# Patient Record
Sex: MALE
[De-identification: names, ages, dates, MRNs, and addresses within clinical notes are randomized per-mention and may not be internally consistent; named-entity substitution may affect disease eponyms.]

---

## 2023-08-01 ENCOUNTER — APPOINTMENT (OUTPATIENT)
Dept: ORTHOPEDIC SURGERY | Facility: CLINIC | Age: 28
End: 2023-08-01
Payer: COMMERCIAL

## 2023-08-01 VITALS — WEIGHT: 185 LBS | RESPIRATION RATE: 16 BRPM | BODY MASS INDEX: 25.9 KG/M2 | HEIGHT: 71 IN

## 2023-08-01 DIAGNOSIS — Z87.39 PERSONAL HISTORY OF OTHER DISEASES OF THE MUSCULOSKELETAL SYSTEM AND CONNECTIVE TISSUE: ICD-10-CM

## 2023-08-01 DIAGNOSIS — Z78.9 OTHER SPECIFIED HEALTH STATUS: ICD-10-CM

## 2023-08-01 DIAGNOSIS — Z87.19 PERSONAL HISTORY OF OTHER DISEASES OF THE DIGESTIVE SYSTEM: ICD-10-CM

## 2023-08-01 PROBLEM — Z00.00 ENCOUNTER FOR PREVENTIVE HEALTH EXAMINATION: Status: ACTIVE | Noted: 2023-08-01

## 2023-08-01 PROCEDURE — 99203 OFFICE O/P NEW LOW 30 MIN: CPT

## 2023-08-01 PROCEDURE — 73130 X-RAY EXAM OF HAND: CPT | Mod: LT

## 2023-08-01 RX ORDER — BUDESONIDE AND FORMOTEROL FUMARATE DIHYDRATE 160; 4.5 UG/1; UG/1
AEROSOL RESPIRATORY (INHALATION)
Refills: 0 | Status: ACTIVE | COMMUNITY

## 2023-08-01 RX ORDER — DEXTROAMPHETAMINE SACCHARATE, AMPHETAMINE ASPARTATE, DEXTROAMPHETAMINE SULFATE, AND AMPHETAMINE SULFATE 3.75; 3.75; 3.75; 3.75 MG/1; MG/1; MG/1; MG/1
TABLET ORAL
Refills: 0 | Status: ACTIVE | COMMUNITY

## 2023-08-01 RX ORDER — ALBUTEROL 90 MCG
AEROSOL (GRAM) INHALATION
Refills: 0 | Status: ACTIVE | COMMUNITY

## 2023-08-01 RX ORDER — USTEKINUMAB 130 MG/26ML
SOLUTION INTRAVENOUS
Refills: 0 | Status: ACTIVE | COMMUNITY

## 2023-08-11 ENCOUNTER — APPOINTMENT (OUTPATIENT)
Dept: ORTHOPEDIC SURGERY | Facility: CLINIC | Age: 28
End: 2023-08-11
Payer: COMMERCIAL

## 2023-08-11 VITALS — HEIGHT: 71 IN | BODY MASS INDEX: 25.9 KG/M2 | RESPIRATION RATE: 16 BRPM | WEIGHT: 185 LBS

## 2023-08-11 PROCEDURE — 73140 X-RAY EXAM OF FINGER(S): CPT | Mod: F4

## 2023-08-11 PROCEDURE — 99213 OFFICE O/P EST LOW 20 MIN: CPT

## 2023-09-06 ENCOUNTER — APPOINTMENT (OUTPATIENT)
Dept: ORTHOPEDIC SURGERY | Facility: CLINIC | Age: 28
End: 2023-09-06
Payer: COMMERCIAL

## 2023-09-06 VITALS — RESPIRATION RATE: 16 BRPM | HEIGHT: 71 IN | WEIGHT: 185 LBS | BODY MASS INDEX: 25.9 KG/M2

## 2023-09-06 DIAGNOSIS — S69.92XD UNSPECIFIED INJURY OF LEFT WRIST, HAND AND FINGER(S), SUBSEQUENT ENCOUNTER: ICD-10-CM

## 2023-09-06 PROCEDURE — 99213 OFFICE O/P EST LOW 20 MIN: CPT

## 2023-09-06 PROCEDURE — 73140 X-RAY EXAM OF FINGER(S): CPT | Mod: F4

## 2023-09-06 NOTE — PHYSICAL EXAM
[de-identified] : Skin is intact no evidence of infection no evidence of joint instability of the volar plate or collateral ligaments with the flexion extensor mechanism intact.  Residual swelling of the PIP joint but noted swelling or tenderness of the MP or DIP joint  Range of motion 0/90 of the MP 5/100 of the PIP and 0/90 of the DIP joint.  The left fifth PIP joint is missing 5 degrees terminal extension and 10 degrees terminal flexion as compared to the opposite side.  There is soft endpoints and no evidence of instability   [de-identified] : PA lateral and oblique shows a congruent joint with mild resorption of the bone fragment from the left fifth middle phalanx.

## 2023-09-06 NOTE — ASSESSMENT
[FreeTextEntry1] : 6 weeks status post left fifth PIP dislocation with associated fracture.  Patient has a stable joint but residual swelling and stiffness which has not resolved despite home program.  Options were discussed ranging from conservative management to more aggressive forms of treatment such as therapy, injection, or surgery.  Risk associated with each option discussed and all questions answered.  Since the range of motion has not been fully restored with his independent home program he would like to follow-up with an outside therapist.  A prescription was provided.  If full range of motion is not restored in the next 6 weeks he will return to the office for reevaluation and more aggressive forms of treatment such as injections may be considered.

## 2023-09-06 NOTE — HISTORY OF PRESENT ILLNESS
[Right] : right hand dominant [FreeTextEntry1] : Date of injury: July 26, 2023  Patient here 6 weeks status post left fifth middle phalanx volar plate avulsion fracture with dislocation.  Patient complains of mostly stiffness in the finger.  Patient has not gone to therapy but does have a home program.

## 2023-10-18 ENCOUNTER — APPOINTMENT (OUTPATIENT)
Dept: ORTHOPEDIC SURGERY | Facility: CLINIC | Age: 28
End: 2023-10-18

## 2024-12-25 PROBLEM — F10.90 ALCOHOL USE: Status: ACTIVE | Noted: 2023-08-01

## 2025-06-18 ENCOUNTER — APPOINTMENT (OUTPATIENT)
Dept: GASTROENTEROLOGY | Facility: CLINIC | Age: 30
End: 2025-06-18

## 2025-07-08 ENCOUNTER — APPOINTMENT (OUTPATIENT)
Dept: GASTROENTEROLOGY | Facility: CLINIC | Age: 30
End: 2025-07-08
Payer: COMMERCIAL

## 2025-07-08 VITALS
DIASTOLIC BLOOD PRESSURE: 80 MMHG | OXYGEN SATURATION: 98 % | TEMPERATURE: 95.6 F | BODY MASS INDEX: 26.6 KG/M2 | RESPIRATION RATE: 14 BRPM | HEIGHT: 71 IN | SYSTOLIC BLOOD PRESSURE: 126 MMHG | HEART RATE: 68 BPM | WEIGHT: 190 LBS

## 2025-07-08 PROBLEM — K50.10 CROHN'S DISEASE OF LARGE INTESTINE WITHOUT COMPLICATION: Status: ACTIVE | Noted: 2025-07-08

## 2025-07-08 PROCEDURE — G2211 COMPLEX E/M VISIT ADD ON: CPT | Mod: NC

## 2025-07-08 PROCEDURE — 99205 OFFICE O/P NEW HI 60 MIN: CPT

## 2025-07-08 RX ORDER — ONDANSETRON 4 MG/1
4 TABLET, ORALLY DISINTEGRATING ORAL
Qty: 30 | Refills: 3 | Status: ACTIVE | COMMUNITY
Start: 2025-07-08 | End: 1900-01-01

## 2025-07-08 RX ORDER — OMEPRAZOLE 20 MG/1
20 CAPSULE, DELAYED RELEASE ORAL DAILY
Qty: 30 | Refills: 3 | Status: ACTIVE | COMMUNITY
Start: 2025-07-08 | End: 1900-01-01

## 2025-07-08 RX ORDER — USTEKINUMAB 90 MG/ML
90 INJECTION, SOLUTION SUBCUTANEOUS
Qty: 4 | Refills: 11 | Status: ACTIVE | COMMUNITY
Start: 2025-07-08 | End: 1900-01-01

## 2025-07-21 ENCOUNTER — TRANSCRIPTION ENCOUNTER (OUTPATIENT)
Age: 30
End: 2025-07-21

## 2025-07-22 ENCOUNTER — TRANSCRIPTION ENCOUNTER (OUTPATIENT)
Age: 30
End: 2025-07-22

## 2025-07-22 ENCOUNTER — RESULT REVIEW (OUTPATIENT)
Age: 30
End: 2025-07-22

## 2025-07-22 ENCOUNTER — APPOINTMENT (OUTPATIENT)
Dept: MRI IMAGING | Facility: HOSPITAL | Age: 30
End: 2025-07-22

## 2025-07-22 ENCOUNTER — OUTPATIENT (OUTPATIENT)
Dept: OUTPATIENT SERVICES | Facility: HOSPITAL | Age: 30
LOS: 1 days | End: 2025-07-22
Payer: COMMERCIAL

## 2025-07-22 PROCEDURE — 74183 MRI ABD W/O CNTR FLWD CNTR: CPT | Mod: 26

## 2025-07-22 PROCEDURE — 72197 MRI PELVIS W/O & W/DYE: CPT | Mod: 26

## 2025-07-29 ENCOUNTER — APPOINTMENT (OUTPATIENT)
Dept: PEDIATRIC ALLERGY IMMUNOLOGY | Facility: CLINIC | Age: 30
End: 2025-07-29

## 2025-08-04 ENCOUNTER — TRANSCRIPTION ENCOUNTER (OUTPATIENT)
Age: 30
End: 2025-08-04

## 2025-08-13 ENCOUNTER — TRANSCRIPTION ENCOUNTER (OUTPATIENT)
Age: 30
End: 2025-08-13

## 2025-08-14 ENCOUNTER — TRANSCRIPTION ENCOUNTER (OUTPATIENT)
Age: 30
End: 2025-08-14

## 2025-08-19 ENCOUNTER — TRANSCRIPTION ENCOUNTER (OUTPATIENT)
Age: 30
End: 2025-08-19

## 2025-08-21 ENCOUNTER — TRANSCRIPTION ENCOUNTER (OUTPATIENT)
Age: 30
End: 2025-08-21

## 2025-08-22 RX ORDER — USTEKINUMAB 90 MG/ML
90 INJECTION, SOLUTION SUBCUTANEOUS
Qty: 1 | Refills: 5 | Status: ACTIVE | COMMUNITY
Start: 2025-08-18 | End: 1900-01-01

## 2025-09-03 ENCOUNTER — APPOINTMENT (OUTPATIENT)
Dept: INTERNAL MEDICINE | Facility: CLINIC | Age: 30
End: 2025-09-03
Payer: COMMERCIAL

## 2025-09-03 ENCOUNTER — NON-APPOINTMENT (OUTPATIENT)
Age: 30
End: 2025-09-03

## 2025-09-03 VITALS
DIASTOLIC BLOOD PRESSURE: 88 MMHG | TEMPERATURE: 98 F | WEIGHT: 190 LBS | HEIGHT: 71 IN | SYSTOLIC BLOOD PRESSURE: 142 MMHG | HEART RATE: 60 BPM | BODY MASS INDEX: 26.6 KG/M2 | OXYGEN SATURATION: 95 %

## 2025-09-03 DIAGNOSIS — B00.9 HERPESVIRAL INFECTION, UNSPECIFIED: ICD-10-CM

## 2025-09-03 DIAGNOSIS — L65.9 NONSCARRING HAIR LOSS, UNSPECIFIED: ICD-10-CM

## 2025-09-03 DIAGNOSIS — Z00.00 ENCOUNTER FOR GENERAL ADULT MEDICAL EXAMINATION W/OUT ABNORMAL FINDINGS: ICD-10-CM

## 2025-09-03 DIAGNOSIS — J45.909 UNSPECIFIED ASTHMA, UNCOMPLICATED: ICD-10-CM

## 2025-09-03 DIAGNOSIS — I10 ESSENTIAL (PRIMARY) HYPERTENSION: ICD-10-CM

## 2025-09-03 PROCEDURE — 99385 PREV VISIT NEW AGE 18-39: CPT

## 2025-09-03 PROCEDURE — 36415 COLL VENOUS BLD VENIPUNCTURE: CPT

## 2025-09-03 RX ORDER — FINASTERIDE 1 MG/1
1 TABLET ORAL DAILY
Qty: 90 | Refills: 1 | Status: ACTIVE | COMMUNITY
Start: 1900-01-01 | End: 1900-01-01

## 2025-09-03 RX ORDER — FINASTERIDE 5 MG/1
5 TABLET, FILM COATED ORAL
Refills: 0 | Status: DISCONTINUED | COMMUNITY
End: 2025-09-03

## 2025-09-03 RX ORDER — VALACYCLOVIR 500 MG/1
500 TABLET, FILM COATED ORAL DAILY
Qty: 90 | Refills: 1 | Status: ACTIVE | COMMUNITY
Start: 1900-01-01 | End: 1900-01-01

## 2025-09-03 RX ORDER — ALBUTEROL SULFATE 90 UG/1
108 (90 BASE) INHALANT RESPIRATORY (INHALATION)
Qty: 1 | Refills: 2 | Status: ACTIVE | COMMUNITY
Start: 2025-09-03 | End: 1900-01-01

## 2025-09-03 RX ORDER — TELMISARTAN 20 MG/1
20 TABLET ORAL DAILY
Qty: 90 | Refills: 1 | Status: ACTIVE | COMMUNITY
Start: 1900-01-01 | End: 1900-01-01

## 2025-09-03 RX ORDER — BUDESONIDE AND FORMOTEROL FUMARATE DIHYDRATE 160; 4.5 UG/1; UG/1
160-4.5 AEROSOL RESPIRATORY (INHALATION)
Qty: 1 | Refills: 2 | Status: ACTIVE | COMMUNITY
Start: 2025-09-03 | End: 1900-01-01

## 2025-09-04 ENCOUNTER — TRANSCRIPTION ENCOUNTER (OUTPATIENT)
Age: 30
End: 2025-09-04

## 2025-09-04 LAB
25(OH)D3 SERPL-MCNC: 41.3 NG/ML
ALBUMIN SERPL ELPH-MCNC: 4.9 G/DL
ALP BLD-CCNC: 38 U/L
ALT SERPL-CCNC: 24 U/L
ANION GAP SERPL CALC-SCNC: 15 MMOL/L
APPEARANCE: CLEAR
AST SERPL-CCNC: 26 U/L
BASOPHILS # BLD AUTO: 0.05 K/UL
BASOPHILS NFR BLD AUTO: 0.7 %
BILIRUB SERPL-MCNC: 0.6 MG/DL
BILIRUBIN URINE: NEGATIVE
BLOOD URINE: NEGATIVE
BUN SERPL-MCNC: 14 MG/DL
CALCIUM SERPL-MCNC: 9.7 MG/DL
CHLORIDE SERPL-SCNC: 102 MMOL/L
CHOLEST SERPL-MCNC: 202 MG/DL
CO2 SERPL-SCNC: 24 MMOL/L
COLOR: YELLOW
CREAT SERPL-MCNC: 1.06 MG/DL
EGFRCR SERPLBLD CKD-EPI 2021: 99 ML/MIN/1.73M2
EOSINOPHIL # BLD AUTO: 0.24 K/UL
EOSINOPHIL NFR BLD AUTO: 3.6 %
ESTIMATED AVERAGE GLUCOSE: 88 MG/DL
GLUCOSE QUALITATIVE U: NEGATIVE MG/DL
GLUCOSE SERPL-MCNC: 88 MG/DL
HBA1C MFR BLD HPLC: 4.7 %
HCT VFR BLD CALC: 42.8 %
HDLC SERPL-MCNC: 50 MG/DL
HGB BLD-MCNC: 14.4 G/DL
IMM GRANULOCYTES NFR BLD AUTO: 0.3 %
KETONES URINE: NEGATIVE MG/DL
LDLC SERPL-MCNC: 118 MG/DL
LEUKOCYTE ESTERASE URINE: NEGATIVE
LYMPHOCYTES # BLD AUTO: 2.01 K/UL
LYMPHOCYTES NFR BLD AUTO: 29.8 %
MAN DIFF?: NORMAL
MCHC RBC-ENTMCNC: 28.2 PG
MCHC RBC-ENTMCNC: 33.6 G/DL
MCV RBC AUTO: 83.8 FL
MONOCYTES # BLD AUTO: 0.48 K/UL
MONOCYTES NFR BLD AUTO: 7.1 %
NEUTROPHILS # BLD AUTO: 3.95 K/UL
NEUTROPHILS NFR BLD AUTO: 58.5 %
NITRITE URINE: NEGATIVE
NONHDLC SERPL-MCNC: 152 MG/DL
PH URINE: 7.5
PLATELET # BLD AUTO: 250 K/UL
POTASSIUM SERPL-SCNC: 4.3 MMOL/L
PROT SERPL-MCNC: 7.8 G/DL
PROTEIN URINE: NORMAL MG/DL
RBC # BLD: 5.11 M/UL
RBC # FLD: 13.2 %
SODIUM SERPL-SCNC: 140 MMOL/L
SPECIFIC GRAVITY URINE: 1.01
TRIGL SERPL-MCNC: 191 MG/DL
TSH SERPL-ACNC: 1.55 UIU/ML
UROBILINOGEN URINE: 1 MG/DL
WBC # FLD AUTO: 6.75 K/UL

## 2025-09-15 ENCOUNTER — TRANSCRIPTION ENCOUNTER (OUTPATIENT)
Age: 30
End: 2025-09-15

## 2025-09-16 ENCOUNTER — TRANSCRIPTION ENCOUNTER (OUTPATIENT)
Age: 30
End: 2025-09-16

## 2025-09-18 ENCOUNTER — TRANSCRIPTION ENCOUNTER (OUTPATIENT)
Age: 30
End: 2025-09-18